# Patient Record
Sex: FEMALE | Race: WHITE | Employment: STUDENT | URBAN - METROPOLITAN AREA
[De-identification: names, ages, dates, MRNs, and addresses within clinical notes are randomized per-mention and may not be internally consistent; named-entity substitution may affect disease eponyms.]

---
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
## 2024-04-11 ENCOUNTER — OFFICE VISIT (OUTPATIENT)
Age: 24
End: 2024-04-11

## 2024-04-11 VITALS
BODY MASS INDEX: 28.71 KG/M2 | DIASTOLIC BLOOD PRESSURE: 84 MMHG | SYSTOLIC BLOOD PRESSURE: 135 MMHG | HEART RATE: 89 BPM | HEIGHT: 62 IN | TEMPERATURE: 98.3 F | WEIGHT: 156 LBS | OXYGEN SATURATION: 97 %

## 2024-04-11 DIAGNOSIS — S60.221A CONTUSION OF RIGHT HAND, INITIAL ENCOUNTER: Primary | ICD-10-CM

## 2024-04-11 DIAGNOSIS — S69.91XA INJURY OF RIGHT HAND, INITIAL ENCOUNTER: ICD-10-CM

## 2024-04-11 NOTE — PATIENT INSTRUCTIONS
Thank you for allowing us to care for you today and we hope you feel better soon  Tylenol/Motrin as needed for fever and discomfort  Ice today then heat tomorrow  
English

## 2024-04-11 NOTE — PROGRESS NOTES
Kinjal Ann (:  2000) is a 23 y.o. female,New patient, here for evaluation of the following chief complaint(s):  Hand Injury (Right hand injury x yesterday, pt. Punched and wall and now is having mid hand pain and tingling)      ASSESSMENT/PLAN:    ICD-10-CM    1. Contusion of right hand, initial encounter  S60.221A       2. Injury of right hand, initial encounter  S69.91XA XR HAND RIGHT (MIN 3 VIEWS)      Xray Result (most recent):  XR HAND RIGHT (MIN 3 VIEWS) 2024    Narrative  EXAMINATION:  THREE XRAY VIEWS OF THE RIGHT HAND    2024 12:57 pm    COMPARISON:  None.    HISTORY:  ORDERING SYSTEM PROVIDED HISTORY: Injury of right hand, initial encounter    FINDINGS:  Alignment and mineralization within normal limits.  Negative for fracture or  dislocation.  No soft tissue swelling.    Impression  No fracture       Dx Diff:fracture, contusion   Education and handout provided on diagnosis and management of symptoms.   AVS reviewed with patient. Follow up as needed in UC or with PCP for new or worsening symptoms.   Return if symptoms worsen or fail to improve.    SUBJECTIVE/OBJECTIVE:  Patient presents today with complaints of right hand pain that started yesterday when she punched a wall      History provided by:  Patient   used: No    Hand Injury   The incident occurred 12 to 24 hours ago. The incident occurred at home. The injury mechanism was a direct blow. The pain is present in the right hand.       Vitals:    24 1237   BP: 135/84   Site: Right Upper Arm   Position: Sitting   Cuff Size: Large Adult   Pulse: 89   Temp: 98.3 °F (36.8 °C)   TempSrc: Oral   SpO2: 97%   Weight: 70.8 kg (156 lb)   Height: 1.575 m (5' 2\")       Review of Systems    Physical Exam  Constitutional:       Appearance: Normal appearance.   HENT:      Head: Normocephalic.      Nose: Nose normal.      Mouth/Throat:      Mouth: Mucous membranes are moist.      Pharynx: Oropharynx is clear.